# Patient Record
Sex: MALE | Race: WHITE | Employment: UNEMPLOYED | URBAN - METROPOLITAN AREA
[De-identification: names, ages, dates, MRNs, and addresses within clinical notes are randomized per-mention and may not be internally consistent; named-entity substitution may affect disease eponyms.]

---

## 2021-10-02 ENCOUNTER — APPOINTMENT (OUTPATIENT)
Dept: CT IMAGING | Age: 46
End: 2021-10-02
Attending: EMERGENCY MEDICINE
Payer: COMMERCIAL

## 2021-10-02 ENCOUNTER — HOSPITAL ENCOUNTER (EMERGENCY)
Age: 46
Discharge: OTHER INSTITUTION | End: 2021-10-02
Attending: EMERGENCY MEDICINE
Payer: COMMERCIAL

## 2021-10-02 ENCOUNTER — HOSPITAL ENCOUNTER (EMERGENCY)
Age: 46
Discharge: ANOTHER HOSPITAL | End: 2021-10-03
Attending: EMERGENCY MEDICINE
Payer: COMMERCIAL

## 2021-10-02 VITALS
DIASTOLIC BLOOD PRESSURE: 89 MMHG | WEIGHT: 315 LBS | OXYGEN SATURATION: 92 % | SYSTOLIC BLOOD PRESSURE: 160 MMHG | HEIGHT: 70 IN | TEMPERATURE: 96.9 F | HEART RATE: 78 BPM | RESPIRATION RATE: 22 BRPM | BODY MASS INDEX: 45.1 KG/M2

## 2021-10-02 DIAGNOSIS — I16.9 HYPERTENSIVE CRISIS: Primary | ICD-10-CM

## 2021-10-02 DIAGNOSIS — I61.4 CEREBELLAR BLEED (HCC): ICD-10-CM

## 2021-10-02 DIAGNOSIS — R73.9 HYPERGLYCEMIA: ICD-10-CM

## 2021-10-02 DIAGNOSIS — I61.4 CEREBELLAR BLEED (HCC): Primary | ICD-10-CM

## 2021-10-02 DIAGNOSIS — R77.8 ELEVATED TROPONIN I LEVEL: ICD-10-CM

## 2021-10-02 DIAGNOSIS — I16.1 HYPERTENSIVE EMERGENCY: ICD-10-CM

## 2021-10-02 LAB
ALBUMIN SERPL-MCNC: 3.5 G/DL (ref 3.5–5)
ALBUMIN/GLOB SERPL: 0.8 {RATIO} (ref 1.1–2.2)
ALP SERPL-CCNC: 86 U/L (ref 45–117)
ALT SERPL-CCNC: 36 U/L (ref 12–78)
ANION GAP SERPL CALC-SCNC: 10 MMOL/L (ref 5–15)
AST SERPL-CCNC: 26 U/L (ref 15–37)
BASOPHILS # BLD: 0 K/UL (ref 0–0.1)
BASOPHILS NFR BLD: 0 % (ref 0–1)
BILIRUB SERPL-MCNC: 1.4 MG/DL (ref 0.2–1)
BUN SERPL-MCNC: 11 MG/DL (ref 6–20)
BUN/CREAT SERPL: 10 (ref 12–20)
CALCIUM SERPL-MCNC: 8.4 MG/DL (ref 8.5–10.1)
CHLORIDE SERPL-SCNC: 98 MMOL/L (ref 97–108)
CO2 SERPL-SCNC: 23 MMOL/L (ref 21–32)
COMMENT, HOLDF: NORMAL
CREAT SERPL-MCNC: 1.14 MG/DL (ref 0.7–1.3)
DIFFERENTIAL METHOD BLD: ABNORMAL
EOSINOPHIL # BLD: 0 K/UL (ref 0–0.4)
EOSINOPHIL NFR BLD: 0 % (ref 0–7)
ERYTHROCYTE [DISTWIDTH] IN BLOOD BY AUTOMATED COUNT: 14.3 % (ref 11.5–14.5)
GLOBULIN SER CALC-MCNC: 4.4 G/DL (ref 2–4)
GLUCOSE BLD STRIP.AUTO-MCNC: 218 MG/DL (ref 65–117)
GLUCOSE SERPL-MCNC: 234 MG/DL (ref 65–100)
HCT VFR BLD AUTO: 44.5 % (ref 36.6–50.3)
HGB BLD-MCNC: 14.6 G/DL (ref 12.1–17)
IMM GRANULOCYTES # BLD AUTO: 0 K/UL
IMM GRANULOCYTES NFR BLD AUTO: 0 %
INR PPP: 1.2 (ref 0.9–1.1)
LYMPHOCYTES # BLD: 0.3 K/UL (ref 0.8–3.5)
LYMPHOCYTES NFR BLD: 3 % (ref 12–49)
MCH RBC QN AUTO: 26.8 PG (ref 26–34)
MCHC RBC AUTO-ENTMCNC: 32.8 G/DL (ref 30–36.5)
MCV RBC AUTO: 81.7 FL (ref 80–99)
MONOCYTES # BLD: 0.1 K/UL (ref 0–1)
MONOCYTES NFR BLD: 1 % (ref 5–13)
NEUTS BAND NFR BLD MANUAL: 17 % (ref 0–6)
NEUTS SEG # BLD: 10.8 K/UL (ref 1.8–8)
NEUTS SEG NFR BLD: 79 % (ref 32–75)
NRBC # BLD: 0 K/UL (ref 0–0.01)
NRBC BLD-RTO: 0 PER 100 WBC
PLATELET # BLD AUTO: 160 K/UL (ref 150–400)
PMV BLD AUTO: 11.4 FL (ref 8.9–12.9)
POTASSIUM SERPL-SCNC: 4.3 MMOL/L (ref 3.5–5.1)
PROT SERPL-MCNC: 7.9 G/DL (ref 6.4–8.2)
PROTHROMBIN TIME: 12.1 SEC (ref 9–11.1)
RBC # BLD AUTO: 5.45 M/UL (ref 4.1–5.7)
RBC MORPH BLD: ABNORMAL
SAMPLES BEING HELD,HOLD: NORMAL
SERVICE CMNT-IMP: ABNORMAL
SODIUM SERPL-SCNC: 131 MMOL/L (ref 136–145)
TROPONIN I SERPL-MCNC: 0.22 NG/ML
WBC # BLD AUTO: 11.2 K/UL (ref 4.1–11.1)

## 2021-10-02 PROCEDURE — 96374 THER/PROPH/DIAG INJ IV PUSH: CPT

## 2021-10-02 PROCEDURE — 70450 CT HEAD/BRAIN W/O DYE: CPT

## 2021-10-02 PROCEDURE — 82962 GLUCOSE BLOOD TEST: CPT

## 2021-10-02 PROCEDURE — 74011000636 HC RX REV CODE- 636: Performed by: RADIOLOGY

## 2021-10-02 PROCEDURE — 80053 COMPREHEN METABOLIC PANEL: CPT

## 2021-10-02 PROCEDURE — 75810000275 HC EMERGENCY DEPT VISIT NO LEVEL OF CARE

## 2021-10-02 PROCEDURE — 93005 ELECTROCARDIOGRAM TRACING: CPT

## 2021-10-02 PROCEDURE — 84484 ASSAY OF TROPONIN QUANT: CPT

## 2021-10-02 PROCEDURE — 96375 TX/PRO/DX INJ NEW DRUG ADDON: CPT

## 2021-10-02 PROCEDURE — 96376 TX/PRO/DX INJ SAME DRUG ADON: CPT

## 2021-10-02 PROCEDURE — 36415 COLL VENOUS BLD VENIPUNCTURE: CPT

## 2021-10-02 PROCEDURE — 0042T CT CODE NEURO PERF W CBF: CPT

## 2021-10-02 PROCEDURE — 70498 CT ANGIOGRAPHY NECK: CPT

## 2021-10-02 PROCEDURE — 99285 EMERGENCY DEPT VISIT HI MDM: CPT

## 2021-10-02 PROCEDURE — 85025 COMPLETE CBC W/AUTO DIFF WBC: CPT

## 2021-10-02 PROCEDURE — 85610 PROTHROMBIN TIME: CPT

## 2021-10-02 PROCEDURE — 74011250636 HC RX REV CODE- 250/636: Performed by: EMERGENCY MEDICINE

## 2021-10-02 PROCEDURE — 74011000258 HC RX REV CODE- 258: Performed by: EMERGENCY MEDICINE

## 2021-10-02 PROCEDURE — 74011000250 HC RX REV CODE- 250: Performed by: EMERGENCY MEDICINE

## 2021-10-02 RX ORDER — ONDANSETRON 2 MG/ML
4 INJECTION INTRAMUSCULAR; INTRAVENOUS
Status: COMPLETED | OUTPATIENT
Start: 2021-10-02 | End: 2021-10-02

## 2021-10-02 RX ORDER — LABETALOL HCL 20 MG/4 ML
20 SYRINGE (ML) INTRAVENOUS
Status: COMPLETED | OUTPATIENT
Start: 2021-10-02 | End: 2021-10-02

## 2021-10-02 RX ADMIN — IOPAMIDOL 40 ML: 755 INJECTION, SOLUTION INTRAVENOUS at 21:16

## 2021-10-02 RX ADMIN — IOPAMIDOL 100 ML: 755 INJECTION, SOLUTION INTRAVENOUS at 21:17

## 2021-10-02 RX ADMIN — LABETALOL HYDROCHLORIDE 20 MG: 5 INJECTION, SOLUTION INTRAVENOUS at 21:41

## 2021-10-02 RX ADMIN — SODIUM CHLORIDE 2.5 MG/HR: 9 INJECTION, SOLUTION INTRAVENOUS at 22:09

## 2021-10-02 RX ADMIN — ONDANSETRON 4 MG: 2 INJECTION INTRAMUSCULAR; INTRAVENOUS at 21:40

## 2021-10-03 VITALS
BODY MASS INDEX: 45.1 KG/M2 | TEMPERATURE: 97.7 F | SYSTOLIC BLOOD PRESSURE: 168 MMHG | RESPIRATION RATE: 18 BRPM | HEIGHT: 70 IN | WEIGHT: 315 LBS | OXYGEN SATURATION: 98 % | DIASTOLIC BLOOD PRESSURE: 86 MMHG | HEART RATE: 89 BPM

## 2021-10-03 PROCEDURE — C9113 INJ PANTOPRAZOLE SODIUM, VIA: HCPCS | Performed by: EMERGENCY MEDICINE

## 2021-10-03 PROCEDURE — 74011250636 HC RX REV CODE- 250/636: Performed by: EMERGENCY MEDICINE

## 2021-10-03 PROCEDURE — 74011000250 HC RX REV CODE- 250: Performed by: EMERGENCY MEDICINE

## 2021-10-03 RX ORDER — ONDANSETRON 2 MG/ML
8 INJECTION INTRAMUSCULAR; INTRAVENOUS
Status: COMPLETED | OUTPATIENT
Start: 2021-10-03 | End: 2021-10-03

## 2021-10-03 RX ORDER — HYDRALAZINE HYDROCHLORIDE 20 MG/ML
10 INJECTION INTRAMUSCULAR; INTRAVENOUS ONCE
Status: COMPLETED | OUTPATIENT
Start: 2021-10-03 | End: 2021-10-03

## 2021-10-03 RX ORDER — HYDRALAZINE HYDROCHLORIDE 20 MG/ML
20 INJECTION INTRAMUSCULAR; INTRAVENOUS ONCE
Status: DISCONTINUED | OUTPATIENT
Start: 2021-10-03 | End: 2021-10-03

## 2021-10-03 RX ORDER — HYDRALAZINE HYDROCHLORIDE 20 MG/ML
20 INJECTION INTRAMUSCULAR; INTRAVENOUS ONCE
Status: COMPLETED | OUTPATIENT
Start: 2021-10-03 | End: 2021-10-03

## 2021-10-03 RX ORDER — ONDANSETRON 2 MG/ML
4 INJECTION INTRAMUSCULAR; INTRAVENOUS
Status: COMPLETED | OUTPATIENT
Start: 2021-10-03 | End: 2021-10-03

## 2021-10-03 RX ADMIN — SODIUM CHLORIDE 15 MG/HR: 900 INJECTION, SOLUTION INTRAVENOUS at 02:33

## 2021-10-03 RX ADMIN — HYDRALAZINE HYDROCHLORIDE 10 MG: 20 INJECTION, SOLUTION INTRAMUSCULAR; INTRAVENOUS at 01:55

## 2021-10-03 RX ADMIN — HYDRALAZINE HYDROCHLORIDE 10 MG: 20 INJECTION, SOLUTION INTRAMUSCULAR; INTRAVENOUS at 03:09

## 2021-10-03 RX ADMIN — SODIUM CHLORIDE 10 MG/HR: 900 INJECTION, SOLUTION INTRAVENOUS at 00:22

## 2021-10-03 RX ADMIN — SODIUM CHLORIDE 15 MG/HR: 900 INJECTION, SOLUTION INTRAVENOUS at 04:45

## 2021-10-03 RX ADMIN — SODIUM CHLORIDE 40 MG: 9 INJECTION, SOLUTION INTRAMUSCULAR; INTRAVENOUS; SUBCUTANEOUS at 05:11

## 2021-10-03 RX ADMIN — SODIUM CHLORIDE 15 MG/HR: 900 INJECTION, SOLUTION INTRAVENOUS at 06:40

## 2021-10-03 RX ADMIN — ONDANSETRON 8 MG: 2 INJECTION INTRAMUSCULAR; INTRAVENOUS at 04:50

## 2021-10-03 RX ADMIN — ONDANSETRON 4 MG: 2 INJECTION INTRAMUSCULAR; INTRAVENOUS at 07:11

## 2021-10-03 RX ADMIN — HYDRALAZINE HYDROCHLORIDE 20 MG: 20 INJECTION, SOLUTION INTRAMUSCULAR; INTRAVENOUS at 04:25

## 2021-10-03 NOTE — ED NOTES
Pt vomiting. MD at bedside to assess. 8mg zofran IVP ordered by provider and given by RN. EKG and protonix also ordered at this time.

## 2021-10-03 NOTE — ED TRIAGE NOTES
Pt arrives with c/o dizziness and nausea since 1200 this afternoon. States that he has had multiple episodes of vomiting. Also states that it has been hard to keep his eyes open.

## 2021-10-03 NOTE — ED NOTES
Spoke w/ the transfer center, pt accepted at [facility] in the ICU. Accepting physician Shelia Arzate. Transfer center will call us back when the patient has a bed assigned in the ICU, then they will set up transport.

## 2021-10-03 NOTE — ED NOTES
TRANSFER - OUT REPORT:    Verbal report given to Remi Sawyer RN (name) on Erinn Treviño  being transferred to [facility] 11th Floor Bed 148 (unit) for routine progression of care       Report consisted of patients Situation, Background, Assessment and   Recommendations(SBAR). Information from the following report(s) SBAR, ED Summary, [facility] and Recent Results was reviewed with the receiving nurse. Lines:   Peripheral IV 10/02/21 Right Antecubital (Active)        Opportunity for questions and clarification was provided.       Patient to be transported with:   Cardene drip  Cardiac monitor  [facility]

## 2021-10-03 NOTE — ED NOTES
[facility] here for pt. Copy of EMTALA scanned into chart and original given to [facility]. Called [facility] neuroscience ICU to let them know that patient is leaving this facility soon.

## 2021-10-03 NOTE — ED NOTES
Spoke w/ MD regarding pt's BP. Systolic still 165Q. 10mg hydralazine given at 0155, will discuss w/ MD who stated she will order something else.

## 2021-10-03 NOTE — ED NOTES
Called transfer center to ask for update. Pt has bed assigned at [facility]. [facility] 11th floor Bed 148. Number to call report [phone]. Transfer center will call back w/ eta of ambulance.

## 2021-10-03 NOTE — ED NOTES
I re-evaluated the patient when his nausea returned. He denies headache, no focal weakness or numbness. Remains alert and talkative. Feels his nausea is related to reflux. Denies cp or SOB. He did have an elevated troponin- likely related to severe hypertension. I did not see an EKG done earlier so we did one here which did not show STEMI or ischemia. He was re-medicated with Zofran. BP is slowly coming down on Cardene gtt and boluses of Hydralazine.

## 2021-10-03 NOTE — ED NOTES
Cardene titrated up to max rate of 15mg/hr. Per MD, BP goal is systolic <729. BP currently 179/99. Spoke with MD who will order something else if pt is not achieving BP goal on cardene. Will continue to monitor.

## 2021-10-03 NOTE — ED NOTES
Pt systolic BP still not <686 and pt is maxed out on cardene. Spoke with MD who will order something else.

## 2021-10-03 NOTE — ED NOTES
ED EKG interpretation:  Rhythm: normal sinus rhythm; and regular . Rate (approx.): 88; Axis: normal; P wave: normal; QRS interval: normal ; ST/T wave: non-specific changes;. This EKG was interpreted by Izabel Tovar MD,ED Provider.

## 2021-10-03 NOTE — ROUTINE PROCESS
TRANSFER - OUT REPORT:    Verbal report given to Isra(name) on Guilherme Peralta  being transferred to [facility] ER(unit) for urgent transfer       Report consisted of patients Situation, Background, Assessment and   Recommendations(SBAR). Information from the following report(s) SBAR, Kardex, ED Summary and MAR was reviewed with the receiving nurse. Lines:   Peripheral IV 10/02/21 Left Antecubital (Active)   Site Assessment Clean, dry, & intact 10/02/21 2128   Phlebitis Assessment 0 10/02/21 2128   Infiltration Assessment 0 10/02/21 2128   Dressing Status Clean, dry, & intact 10/02/21 2128        Opportunity for questions and clarification was provided.       Patient transported with:   Monitor  Tech

## 2021-10-03 NOTE — ED NOTES
Pt BP upon arrival 211/118. Restarted cardene drip from [address] [place] [address] at 5mg/hr at this time. Awaiting new cardene from pharmacy.

## 2021-10-03 NOTE — ED NOTES
Patient has [employer] and prefers not to stay at [facility] since they do not take his insurance. I attempted to call [facility]- they have no available beds. I spoke with [facility] who will accept the patient. THe patient is agreeable to transfer.

## 2021-10-03 NOTE — ED NOTES
This is a 44-year-old male patient who came into the ER today after feeling nauseated while playing video games at home today. The patient then started feeling dizzy and vomited several times at home before deciding to go to the ER.

## 2021-10-03 NOTE — ED NOTES
51-year-old male transferred from [facility] after being on evaluated there and found to have a cerebellar bleed and hypertension. He was started on a Cardene drip, they spoke with neurosurgery in the ICU who accepted the patient here. Patient states his symptoms started around noon with a sudden headache nausea and vomiting. He reports feeling off balance. He states he did not know he had high blood pressure. He has been vaccinated for Covid and had Covid back in December. He has no complaints currently. He is currently in the ED waiting for an ICU bed open. He is in no distress. I have restarted the Cardene drip.

## 2021-10-03 NOTE — ED PROVIDER NOTES
Patient is a 59-year-old male with past medical history significant for obesity but does not follow normally with primary care doctor who presents emergency department for evaluation of dizziness that started approximately noon today. He states he had a very slight headache prior to his symptoms starting and then started to have nausea vomiting multiple times. States that he continued to vomit until he is just dry heaving. He states that when he gets up to walk he feels off balance as though he is listing towards the left side. Denies any focal weakness. Denies any difficulty speaking. Denies any similar past episodes. Denies being on any medicines for blood pressure. His sister at bedside states that he did have Covid infection in December of last year but has been immunized since. Patient states that after vomiting he has no headache has not recurred. Denies any chest pain or shortness of breath. Denies being on any blood thinners other than taking Advil occasionally. He does that occasionally he feels soreness in his neck like he needs to pop it but denies any manipulations on his neck by chiropractor and denies forcefully popping his neck recently. No past medical history on file. No past surgical history on file. No family history on file.     Social History     Socioeconomic History    Marital status: SINGLE     Spouse name: Not on file    Number of children: Not on file    Years of education: Not on file    Highest education level: Not on file   Occupational History    Not on file   Tobacco Use    Smoking status: Not on file   Substance and Sexual Activity    Alcohol use: Not on file    Drug use: Not on file    Sexual activity: Not on file   Other Topics Concern    Not on file   Social History Narrative    Not on file     Social Determinants of Health     Financial Resource Strain:     Difficulty of Paying Living Expenses:    Food Insecurity:     Worried About Running Out of Food in the Last Year:    [address] in the Last Year:    Transportation Needs:     Lack of Transportation (Medical):  Lack of Transportation (Non-Medical):    Physical Activity:     Days of Exercise per Week:     Minutes of Exercise per Session:    Stress:     Feeling of Stress :    Social Connections:     Frequency of Communication with Friends and Family:     Frequency of Social Gatherings with Friends and Family:     Attends [employer] Services:     Active Member of Clubs or Organizations:     Attends Club or Organization Meetings:     Marital Status:    Intimate Partner Violence:     Fear of Current or Ex-Partner:     Emotionally Abused:     Physically Abused:     Sexually Abused: ALLERGIES: Patient has no known allergies. Review of Systems   Constitutional: Negative for fever. HENT: Negative for drooling and facial swelling. Eyes: Negative for photophobia and visual disturbance. Respiratory: Negative for shortness of breath. Cardiovascular: Negative for chest pain. Gastrointestinal: Positive for nausea and vomiting. Negative for abdominal pain. Genitourinary: Negative for dysuria. Musculoskeletal: Negative for back pain, neck pain and neck stiffness. Skin: Negative for rash. Neurological: Positive for dizziness. Negative for seizures, syncope, speech difficulty and numbness. Hematological: Does not bruise/bleed easily. Psychiatric/Behavioral: Negative. Vitals:    10/02/21 2025   BP: (!) 215/134   Pulse: 86   Resp: 16   Temp: 96.9 °F (36.1 °C)   SpO2: 99%   Weight: 152 kg (335 lb)   Height: 5' 10\" (1.778 m)            Physical Exam  Vitals and nursing note reviewed. Constitutional:       Appearance: He is obese. He is ill-appearing. He is not toxic-appearing or diaphoretic. HENT:      Head: Normocephalic and atraumatic.       Right Ear: External ear normal.      Left Ear: External ear normal.      Nose: Nose normal.   Eyes: General: No scleral icterus. Extraocular Movements: Extraocular movements intact. Cardiovascular:      Rate and Rhythm: Normal rate. Pulses: Normal pulses. Pulmonary:      Effort: Pulmonary effort is normal.      Breath sounds: Normal breath sounds. Abdominal:      Palpations: Abdomen is soft. Musculoskeletal:      Cervical back: Neck supple. Skin:     General: Skin is warm and dry. Neurological:      Mental Status: He is alert and oriented to person, place, and time. Comments: NIHSS 1 for decreased sensation to light touch right leg   Psychiatric:         Mood and Affect: Mood normal.         Behavior: Behavior normal.         Thought Content: Thought content normal.         Judgment: Judgment normal.          MDM  Number of Diagnoses or Management Options  Cerebellar bleed [facility]): new and requires workup  Hypertensive crisis: new and requires workup  Diagnosis management comments: Is discussed with on-call neurosurgeon Dr. Tammie Osman who will follow in the ICU at [facility]. Patient and his sister advised of CT findings and need for transfer to higher level of care. They both state they are agreeable to transfer to [facility].        Amount and/or Complexity of Data Reviewed  Clinical lab tests: ordered and reviewed  Tests in the radiology section of CPT®: ordered and reviewed  Discuss the patient with other providers: yes  Independent visualization of images, tracings, or specimens: yes    Risk of Complications, Morbidity, and/or Mortality  Presenting problems: high  Diagnostic procedures: high  Management options: high    Patient Progress  Patient progress: improved    ED Course as of Oct 02 2142   Sat Oct 02, 2021   2134 G obtained at 923 showing sinus rhythm, rate 94,    [JE]      ED Course User Index  [JE] Lico Choi MD       Critical Care  Performed by: Lico Choi MD  Authorized by: Lico Choi MD     Critical care provider statement:     Critical care time (minutes):  55    Critical care time was exclusive of:  Separately billable procedures and treating other patients    Critical care was necessary to treat or prevent imminent or life-threatening deterioration of the following conditions:  CNS failure or compromise    Critical care was time spent personally by me on the following activities:  Ordering and performing treatments and interventions, ordering and review of laboratory studies, ordering and review of radiographic studies, re-evaluation of patient's condition, development of treatment plan with patient or surrogate, discussions with consultants, evaluation of patient's response to treatment, examination of patient and obtaining history from patient or surrogate    I assumed direction of critical care for this patient from another provider in my specialty: no

## 2021-10-03 NOTE — CONSULTS
51yo with cerebellar ICH and hypertension. Agree with cardene gtt to keep SBP<140. MRI brain with and without contrast when stable to rule out underlying lesion or additional lesions. Repeat CT in am.  Avoid anticoagulation. Will follow with you. Thank you for this consultation.

## 2021-10-03 NOTE — ED NOTES
Pt resting in stretcher. Pt remains neurologically intact. Cardene drip running at 12.5mg/hr. Will continue to titrate as needed.

## 2021-10-03 NOTE — ED TRIAGE NOTES
Pt arrives as a transfer of care from [facility] w/ CC head bleed. Presented to [facility] w/ CC nausea and dizziness, was hypertensive in 072E systolic upon arrival. Ct scan showed head bleed and pt was started on a cardene drip at 2.5mg/hr. Cardene drip had to be stopped during transport to [facility] because [facility] cannot transport w/ drips. Pt accepted by ICU. They were notified of pt's arrival. Cardene drip reordered by ED MD. BP upon arrival 211/118. Pt neurologically intact, stroke scale negative.

## 2021-10-04 LAB
ATRIAL RATE: 94 BPM
CALCULATED P AXIS, ECG09: 67 DEGREES
CALCULATED R AXIS, ECG10: 43 DEGREES
CALCULATED T AXIS, ECG11: 72 DEGREES
DIAGNOSIS, 93000: NORMAL
P-R INTERVAL, ECG05: 192 MS
Q-T INTERVAL, ECG07: 380 MS
QRS DURATION, ECG06: 74 MS
QTC CALCULATION (BEZET), ECG08: 475 MS
VENTRICULAR RATE, ECG03: 94 BPM